# Patient Record
Sex: MALE | Race: OTHER | URBAN - METROPOLITAN AREA
[De-identification: names, ages, dates, MRNs, and addresses within clinical notes are randomized per-mention and may not be internally consistent; named-entity substitution may affect disease eponyms.]

---

## 2022-04-11 ENCOUNTER — HOSPITAL ENCOUNTER (OUTPATIENT)
Dept: LAB | Age: 47
Discharge: HOME | End: 2022-04-11

## 2022-04-11 ENCOUNTER — OFFICE VISIT (OUTPATIENT)
Dept: FAMILY MEDICINE CLINIC | Age: 47
End: 2022-04-11

## 2022-04-11 VITALS
HEART RATE: 70 BPM | SYSTOLIC BLOOD PRESSURE: 109 MMHG | OXYGEN SATURATION: 96 % | BODY MASS INDEX: 28.88 KG/M2 | DIASTOLIC BLOOD PRESSURE: 68 MMHG | TEMPERATURE: 97.8 F | WEIGHT: 184 LBS | HEIGHT: 67 IN

## 2022-04-11 DIAGNOSIS — Z00.00 WELLNESS EXAMINATION: Primary | ICD-10-CM

## 2022-04-11 DIAGNOSIS — Z00.00 WELLNESS EXAMINATION: ICD-10-CM

## 2022-04-11 PROCEDURE — 99203 OFFICE O/P NEW LOW 30 MIN: CPT | Performed by: FAMILY MEDICINE

## 2022-04-11 NOTE — PATIENT INSTRUCTIONS
Flonase nasal spray  Okay to buy generic FLUTICASONE nasal spray  The [employer] brand is called \"Allergy relief nasal spray\"

## 2022-04-11 NOTE — PROGRESS NOTES
I have printed AVS and reviewed it with patient today. Patient verbalized understanding. I reviewed the patient instructions with the patient and explained that he can purchase the Flonase OTC. Patient verbalized understanding. The patient correctly confirmed his complete name and date of birth prior to the information shared.  Onur Cartagena with the [place] [id number] assisted with this discharge.  Skinny Cruz RN

## 2022-04-11 NOTE — PROGRESS NOTES
Maria Guadalupe Alberto is a 55 y.o. male   Chief Complaint   Patient presents with    Physical         ASSESSMENT AND PLAN:    1. Wellness examination  Normal vital signs. Asymptomatic, with some PE findings c/w seasonal allergies. Normal colonscopy 5 years ago, not due until 2027. Basic labs today. Followup PRN.  - HEMOGLOBIN A1C WITH EAG; Future  - CBC WITH AUTOMATED DIFF; Future  - METABOLIC PANEL, COMPREHENSIVE; Future  - LIPID PANEL; Future          SUBJECTIVE:    HPI:  Basim BuelliCassidy Heard is a 55 y.o. male who presents for a wellness exam.  He is feeling well overall and does not have any specific concern. His last checkup was about a year ago. PMH: High cholesterol. He was on medication which was discontinued due to elevated LFTs. Started watching his diet one week ago. PSH: Meniscus and ligament repair ~3 years ago after soccer injury. MEDS: None  ALL: NKDA  SH: Denies nicotine/ tobacco use. alcohol consumption once every 6 months. FH: Non-contributory      Review of Systems   Constitutional: Negative for fever and malaise/fatigue. Eyes: Negative for blurred vision. Respiratory: Negative for cough and shortness of breath. Cardiovascular: Negative for chest pain, palpitations and leg swelling. Gastrointestinal: Negative for abdominal pain, constipation, diarrhea, nausea and vomiting. Neurological: Negative for dizziness and headaches. /68 (BP 1 Location: Left upper arm, BP Patient Position: Sitting)   Pulse 70   Temp 97.8 °F (36.6 °C) (Temporal)   Ht 5' 7.17\" (1.706 m)   Wt 184 lb (83.5 kg)   SpO2 96%   BMI 28.68 kg/m²     Physical Exam  Constitutional:       General: He is not in acute distress. Appearance: Normal appearance. HENT:      Head: Normocephalic and atraumatic. Right Ear: A middle ear effusion (clear fluid) is present. Left Ear: A middle ear effusion (clear fluid) is present.       Mouth/Throat:      Mouth: Mucous membranes are moist.      Pharynx: Oropharynx is clear. Posterior oropharyngeal erythema (cobblestoning/ PND) present. No oropharyngeal exudate. Eyes:      Extraocular Movements: Extraocular movements intact. Conjunctiva/sclera: Conjunctivae normal.      Pupils: Pupils are equal, round, and reactive to light. Neck:      Vascular: No carotid bruit. Cardiovascular:      Rate and Rhythm: Normal rate and regular rhythm. Pulses: Normal pulses. Heart sounds: Normal heart sounds. Pulmonary:      Effort: Pulmonary effort is normal. No respiratory distress. Breath sounds: Normal breath sounds. Abdominal:      General: Bowel sounds are normal. There is no distension. Palpations: Abdomen is soft. Tenderness: There is no abdominal tenderness. Musculoskeletal:         General: Normal range of motion. Cervical back: No tenderness. Right lower leg: No edema. Left lower leg: No edema. Lymphadenopathy:      Cervical: No cervical adenopathy. Skin:     General: Skin is warm. Neurological:      Mental Status: He is alert and oriented to person, place, and time.       Gait: Gait normal.      Deep Tendon Reflexes: Reflexes normal.   Psychiatric:         Behavior: Behavior normal.

## 2022-04-12 LAB
ALBUMIN SERPL-MCNC: 4.6 G/DL (ref 3.5–5)
ALBUMIN/GLOB SERPL: 1.4 {RATIO} (ref 1.1–2.2)
ALP SERPL-CCNC: 72 U/L (ref 45–117)
ALT SERPL-CCNC: 30 U/L (ref 12–78)
ANION GAP SERPL CALC-SCNC: 4 MMOL/L (ref 5–15)
AST SERPL-CCNC: 18 U/L (ref 15–37)
BASOPHILS # BLD: 0 K/UL (ref 0–0.1)
BASOPHILS NFR BLD: 1 % (ref 0–1)
BILIRUB SERPL-MCNC: 0.6 MG/DL (ref 0.2–1)
BUN SERPL-MCNC: 16 MG/DL (ref 6–20)
BUN/CREAT SERPL: 19 (ref 12–20)
CALCIUM SERPL-MCNC: 9.6 MG/DL (ref 8.5–10.1)
CHLORIDE SERPL-SCNC: 106 MMOL/L (ref 97–108)
CHOLEST SERPL-MCNC: 239 MG/DL
CO2 SERPL-SCNC: 28 MMOL/L (ref 21–32)
CREAT SERPL-MCNC: 0.86 MG/DL (ref 0.7–1.3)
DIFFERENTIAL METHOD BLD: NORMAL
EOSINOPHIL # BLD: 0.1 K/UL (ref 0–0.4)
EOSINOPHIL NFR BLD: 1 % (ref 0–7)
ERYTHROCYTE [DISTWIDTH] IN BLOOD BY AUTOMATED COUNT: 12.5 % (ref 11.5–14.5)
EST. AVERAGE GLUCOSE BLD GHB EST-MCNC: 111 MG/DL
GLOBULIN SER CALC-MCNC: 3.3 G/DL (ref 2–4)
GLUCOSE SERPL-MCNC: 78 MG/DL (ref 65–100)
HBA1C MFR BLD: 5.5 % (ref 4–5.6)
HCT VFR BLD AUTO: 46.6 % (ref 36.6–50.3)
HDLC SERPL-MCNC: 41 MG/DL
HDLC SERPL: 5.8 {RATIO} (ref 0–5)
HGB BLD-MCNC: 14.9 G/DL (ref 12.1–17)
IMM GRANULOCYTES # BLD AUTO: 0 K/UL (ref 0–0.04)
IMM GRANULOCYTES NFR BLD AUTO: 0 % (ref 0–0.5)
LDLC SERPL CALC-MCNC: 150.8 MG/DL (ref 0–100)
LYMPHOCYTES # BLD: 2.4 K/UL (ref 0.8–3.5)
LYMPHOCYTES NFR BLD: 47 % (ref 12–49)
MCH RBC QN AUTO: 29.5 PG (ref 26–34)
MCHC RBC AUTO-ENTMCNC: 32 G/DL (ref 30–36.5)
MCV RBC AUTO: 92.3 FL (ref 80–99)
MONOCYTES # BLD: 0.4 K/UL (ref 0–1)
MONOCYTES NFR BLD: 8 % (ref 5–13)
NEUTS SEG # BLD: 2.2 K/UL (ref 1.8–8)
NEUTS SEG NFR BLD: 43 % (ref 32–75)
NRBC # BLD: 0 K/UL (ref 0–0.01)
NRBC BLD-RTO: 0 PER 100 WBC
PLATELET # BLD AUTO: 316 K/UL (ref 150–400)
PMV BLD AUTO: 10.3 FL (ref 8.9–12.9)
POTASSIUM SERPL-SCNC: 4.4 MMOL/L (ref 3.5–5.1)
PROT SERPL-MCNC: 7.9 G/DL (ref 6.4–8.2)
RBC # BLD AUTO: 5.05 M/UL (ref 4.1–5.7)
SODIUM SERPL-SCNC: 138 MMOL/L (ref 136–145)
TRIGL SERPL-MCNC: 236 MG/DL (ref ?–150)
VLDLC SERPL CALC-MCNC: 47.2 MG/DL
WBC # BLD AUTO: 5.1 K/UL (ref 4.1–11.1)

## 2022-04-12 PROCEDURE — 80053 COMPREHEN METABOLIC PANEL: CPT

## 2022-04-12 PROCEDURE — 85025 COMPLETE CBC W/AUTO DIFF WBC: CPT

## 2022-04-12 PROCEDURE — 83036 HEMOGLOBIN GLYCOSYLATED A1C: CPT

## 2022-04-12 PROCEDURE — 80061 LIPID PANEL: CPT

## 2022-04-13 NOTE — PROGRESS NOTES
Please let him know that his labwork looked good overall. He is not diabetic, he is not anemic. His liver and kidney function are good. His electrolytes are normal.    As he knows, his cholesterol is elevated. Since he cannot take the medication, he should keep working on his diet. We'll check it again at his next wellness exam.    Thanks.

## 2022-04-18 NOTE — PROGRESS NOTES
Called and spoke with patient verified first,last name and . Informed patient of labs results per Dr Vitaly Ibanez Please let him know that his labwork looked good overall. He is not diabetic, he is not anemic. His liver and kidney function are good. His electrolytes are normal.     As he knows, his cholesterol is elevated. Since he cannot take the medication, he should keep working on his diet. We'll check it again at his next wellness exam.     Thanks.

## 2022-12-15 ENCOUNTER — OFFICE VISIT (OUTPATIENT)
Dept: FAMILY MEDICINE CLINIC | Age: 47
End: 2022-12-15

## 2022-12-15 ENCOUNTER — HOSPITAL ENCOUNTER (OUTPATIENT)
Dept: LAB | Age: 47
Discharge: HOME | End: 2022-12-15

## 2022-12-15 VITALS
HEART RATE: 63 BPM | TEMPERATURE: 97 F | OXYGEN SATURATION: 99 % | BODY MASS INDEX: 27.49 KG/M2 | DIASTOLIC BLOOD PRESSURE: 73 MMHG | HEIGHT: 68 IN | WEIGHT: 181.4 LBS | SYSTOLIC BLOOD PRESSURE: 127 MMHG

## 2022-12-15 DIAGNOSIS — R10.11 RUQ PAIN: ICD-10-CM

## 2022-12-15 DIAGNOSIS — R10.11 RUQ PAIN: Primary | ICD-10-CM

## 2022-12-15 LAB
ALBUMIN SERPL-MCNC: 4.6 G/DL (ref 3.5–5)
ALBUMIN/GLOB SERPL: 1.3 {RATIO} (ref 1.1–2.2)
ALP SERPL-CCNC: 69 U/L (ref 45–117)
ALT SERPL-CCNC: 31 U/L (ref 12–78)
ANION GAP SERPL CALC-SCNC: 3 MMOL/L (ref 5–15)
APPEARANCE UR: CLEAR
AST SERPL-CCNC: 21 U/L (ref 15–37)
BILIRUB SERPL-MCNC: 0.5 MG/DL (ref 0.2–1)
BILIRUB UR QL: NEGATIVE
BUN SERPL-MCNC: 15 MG/DL (ref 6–20)
BUN/CREAT SERPL: 17 (ref 12–20)
CALCIUM SERPL-MCNC: 9.1 MG/DL (ref 8.5–10.1)
CHLORIDE SERPL-SCNC: 104 MMOL/L (ref 97–108)
CO2 SERPL-SCNC: 31 MMOL/L (ref 21–32)
COLOR UR: NORMAL
CREAT SERPL-MCNC: 0.87 MG/DL (ref 0.7–1.3)
GLOBULIN SER CALC-MCNC: 3.5 G/DL (ref 2–4)
GLUCOSE SERPL-MCNC: 88 MG/DL (ref 65–100)
GLUCOSE UR STRIP.AUTO-MCNC: NEGATIVE MG/DL
HGB UR QL STRIP: NEGATIVE
KETONES UR QL STRIP.AUTO: NEGATIVE MG/DL
LEUKOCYTE ESTERASE UR QL STRIP.AUTO: NEGATIVE
LIPASE SERPL-CCNC: 200 U/L (ref 73–393)
NITRITE UR QL STRIP.AUTO: NEGATIVE
PH UR STRIP: 5.5 [PH] (ref 5–8)
POTASSIUM SERPL-SCNC: 4.3 MMOL/L (ref 3.5–5.1)
PROT SERPL-MCNC: 8.1 G/DL (ref 6.4–8.2)
PROT UR STRIP-MCNC: NEGATIVE MG/DL
SODIUM SERPL-SCNC: 138 MMOL/L (ref 136–145)
SP GR UR REFRACTOMETRY: 1.02 (ref 1–1.03)
UROBILINOGEN UR QL STRIP.AUTO: 0.2 EU/DL (ref 0.2–1)

## 2022-12-15 PROCEDURE — 81003 URINALYSIS AUTO W/O SCOPE: CPT

## 2022-12-15 PROCEDURE — 80053 COMPREHEN METABOLIC PANEL: CPT

## 2022-12-15 PROCEDURE — 99213 OFFICE O/P EST LOW 20 MIN: CPT | Performed by: FAMILY MEDICINE

## 2022-12-15 PROCEDURE — 83690 ASSAY OF LIPASE: CPT

## 2022-12-15 PROCEDURE — 36415 COLL VENOUS BLD VENIPUNCTURE: CPT

## 2022-12-15 NOTE — PROGRESS NOTES
Coordination of Care  1. Have you been to the ER, urgent care clinic since your last visit? Hospitalized since your last visit? No    2. Have you seen or consulted any other health care providers outside of the [address] [place] [address] since your last visit? Include any pap smears or colon screening. Yes - [facility] about a week ago. Does the patient need refills? N/A    Learning Assessment Complete?  yes  Depression Screening complete in the past 12 months? yes

## 2022-12-15 NOTE — PROGRESS NOTES
Name and . An After Visit Summary was printed and given to the patient. Information on the AVS was explained to patient/guardian and understanding was verbalized Time was made for question and answers. Assisted patient in making an appointment on 22 at [place]. Special instructions not to eat or drink the day before after midnight. Patient informed that he will need to apply for the Care Card after he receives a bill for the [address] [place] [address].

## 2022-12-15 NOTE — PROGRESS NOTES
Manas Nevarez (: 1975) is a 52 y.o. male, established patient, here for evaluation of the following chief complaint(s):  Abdominal Pain (Pt reports abdominal pain in RLQ (2/10), started one week ago. He reports that his stool started to turn light yellow/brown three days ago. He also reports an elevated lipid test from previous blood work at [facility] about a week ago. )       ASSESSMENT/PLAN:  1. RUQ pain  Now with change in BM suggesting choledocholithiasis. Repeat labs and get US.  -     METABOLIC PANEL, COMPREHENSIVE; Future  -     URINALYSIS W/ RFLX MICROSCOPIC; Future  -     LIPASE; Future  -     US ABD LTD; Future    Follow-up and Dispositions    Return for follow up for VV in 1 week for RUQ pain. SUBJECTIVE:  RUQ Pain: x 1 week. Off/on. Associated with nausea. Not associated with food or BM. Went to [facility] and told labs were NL. Given Prilosec which he is not taking consi  Nausea is less, has noted change in stool. SHx:  Is a student at [employer]. Applied for Medicaid but was declined. Private insurance . Review of Systems   Constitutional:  Negative for chills and fever. Gastrointestinal:  Negative for constipation and vomiting. Genitourinary:  Negative for dysuria. No jaundice. OBJECTIVE:  Blood pressure 127/73, pulse 63, temperature 97 °F (36.1 °C), temperature source Temporal, height 5' 7.52\" (1.715 m), weight 181 lb 6.4 oz (82.3 kg), SpO2 99 %. Physical Exam  CONSTITUTIONAL:  Well developed. No apparent distress. PSYCHIATRIC: Oriented to time, place, person & situation. Appropriate mood and affect. ABDOMEN:  Normal bowel sounds. Abdomen soft, non tender. No hepatosplenomegaly or masses. No results found for this visit on 12/15/22. An electronic signature was used to authenticate this note.   -- Solitarioreld MD Laura

## 2022-12-20 ENCOUNTER — VIRTUAL VISIT (OUTPATIENT)
Dept: FAMILY MEDICINE CLINIC | Age: 47
End: 2022-12-20

## 2022-12-20 DIAGNOSIS — R10.11 RUQ PAIN: Primary | ICD-10-CM

## 2022-12-20 PROCEDURE — 99441 PR PHYS/QHP TELEPHONE EVALUATION 5-10 MIN: CPT | Performed by: FAMILY MEDICINE

## 2022-12-20 NOTE — PROGRESS NOTES
Kelsy Spence (: 1975) is a 52 y.o. male, established patient, Virtual Visit for evaluation of the following chief complaint(s):   Abdominal Pain (Follow up for intermittent RUQ pain. Pain a is bit stronger now, rates 3 on pain scale (0-10) not associated with movement. Pain described as pressure in abdominal area below ribcage.)       ASSESSMENT/PLAN:  1. RUQ pain  Labs unrevealing, do not suggest cholelithiasis. Follow up with US. Ddx include gastritis, post viral.    No follow-ups on file. SUBJECTIVE/OBJECTIVE:  VV for labs review  RUQ Pain:  x 2 weeks. Off/on. Feels like a RUQ pressure at times, up to 3/10. Not severe. Nausea has resolved. BM continue to be lighter in color which is a change. Review of Systems   Constitutional:  Negative for chills and fever. Gastrointestinal:  Negative for constipation, diarrhea, nausea and vomiting. No data recorded    Physical Exam    On this date 2022 I have spent 7 minutes reviewing previous notes, test results and face to face (virtual) with the patient discussing the diagnosis and importance of compliance with the treatment plan as well as documenting on the day of the visit. Kelsy Spence, was evaluated through a synchronous (real-time) audio-video encounter. The patient (or guardian if applicable) is aware that this is a billable service, which includes applicable co-pays. This Virtual Visit was conducted with patient's (and/or legal guardian's) consent. The visit was conducted pursuant to the emergency declaration under the [address] [place] [address] authority and the [employer] General Act. Patient identification was verified, and a caregiver was present when appropriate.   The patient was located at: Home: [place]  The provider was located at: Home: [unfilled]    Patient identification was verified at the start of the visit: YES    Services were provided through a phone synchronous discussion virtually to substitute for in-person clinic visit. Patient was located at home and provider was located in office or at home. An electronic signature was used to authenticate this note.   -- Jose Luis Caballero MD

## 2022-12-20 NOTE — PROGRESS NOTES
Coordination of Care  1. Have you been to the ER, urgent care clinic since your last visit? Hospitalized since your last visit? No    2. Have you seen or consulted any other health care providers outside of the [address] [place] [address] since your last visit? Include any pap smears or colon screening. No    Does the patient need refills? NO    Learning Assessment Complete?  yes  Depression Screening complete in the past 12 months? yes

## 2022-12-27 ENCOUNTER — HOSPITAL ENCOUNTER (OUTPATIENT)
Dept: ULTRASOUND IMAGING | Age: 47
Discharge: HOME | End: 2022-12-27
Attending: FAMILY MEDICINE

## 2022-12-27 DIAGNOSIS — R10.11 RUQ PAIN: ICD-10-CM

## 2022-12-27 PROCEDURE — 76705 ECHO EXAM OF ABDOMEN: CPT

## 2022-12-28 ENCOUNTER — TELEPHONE (OUTPATIENT)
Dept: FAMILY MEDICINE CLINIC | Age: 47
End: 2022-12-28

## 2022-12-28 NOTE — TELEPHONE ENCOUNTER
Von from [facility] radiology department is requesting a call back would like to know if  received the xray report from the patient, please call von back at [phone]

## 2023-01-04 NOTE — PROGRESS NOTES
Spoke with patient and reviewed US results: NL except for mild changes c/w fatty liver. He no longer has pain. BM are still different than prior. Recommend starting OTC Probiotics and follow up if pain recurs.

## 2023-05-22 ENCOUNTER — TELEMEDICINE (OUTPATIENT)
Age: 48
End: 2023-05-22

## 2023-05-22 DIAGNOSIS — E78.2 MIXED HYPERLIPIDEMIA: ICD-10-CM

## 2023-05-22 DIAGNOSIS — R13.10 DYSPHAGIA, UNSPECIFIED TYPE: Primary | ICD-10-CM

## 2023-05-22 PROCEDURE — 99213 OFFICE O/P EST LOW 20 MIN: CPT | Performed by: FAMILY MEDICINE

## 2023-05-22 RX ORDER — PANTOPRAZOLE SODIUM 40 MG/1
40 TABLET, DELAYED RELEASE ORAL
Qty: 30 TABLET | Refills: 1 | Status: SHIPPED | OUTPATIENT
Start: 2023-05-22

## 2023-05-22 SDOH — ECONOMIC STABILITY: TRANSPORTATION INSECURITY
IN THE PAST 12 MONTHS, HAS LACK OF TRANSPORTATION KEPT YOU FROM MEETINGS, WORK, OR FROM GETTING THINGS NEEDED FOR DAILY LIVING?: NO

## 2023-05-22 SDOH — ECONOMIC STABILITY: FOOD INSECURITY: WITHIN THE PAST 12 MONTHS, YOU WORRIED THAT YOUR FOOD WOULD RUN OUT BEFORE YOU GOT MONEY TO BUY MORE.: NEVER TRUE

## 2023-05-22 SDOH — ECONOMIC STABILITY: INCOME INSECURITY: HOW HARD IS IT FOR YOU TO PAY FOR THE VERY BASICS LIKE FOOD, HOUSING, MEDICAL CARE, AND HEATING?: NOT VERY HARD

## 2023-05-22 SDOH — ECONOMIC STABILITY: FOOD INSECURITY: WITHIN THE PAST 12 MONTHS, THE FOOD YOU BOUGHT JUST DIDN'T LAST AND YOU DIDN'T HAVE MONEY TO GET MORE.: NEVER TRUE

## 2023-05-22 ASSESSMENT — PATIENT HEALTH QUESTIONNAIRE - PHQ9
2. FEELING DOWN, DEPRESSED OR HOPELESS: 0
SUM OF ALL RESPONSES TO PHQ QUESTIONS 1-9: 0
SUM OF ALL RESPONSES TO PHQ QUESTIONS 1-9: 0
SUM OF ALL RESPONSES TO PHQ9 QUESTIONS 1 & 2: 0
SUM OF ALL RESPONSES TO PHQ QUESTIONS 1-9: 0
SUM OF ALL RESPONSES TO PHQ QUESTIONS 1-9: 0
1. LITTLE INTEREST OR PLEASURE IN DOING THINGS: 0

## 2023-05-22 NOTE — PROGRESS NOTES
Belinda Barber was spoken to at the time of virtual discharge. Full name and  verified. The After Visit Summary was reviewed. Patient made aware that a patient  will contact him to schedule a 2 week follow up appointment as well as a labs appointment. Reviewed medication list with the patient, teach back method was utilized to ensure patient accurately understands how to and when to take prescribed medication. [place] coupon was provided and explained how to use. Possible side effects and medication compliance were reiterated to ensure. Time for questions and answers provided, patient verbalizes understanding.

## 2023-05-22 NOTE — PROGRESS NOTES
El Tesfaye (: 1975) is a 52 y.o. male, Established patient patient, Virtual Visit for evaluation of the following chief complaint(s):   GI Problem (Esophagus and throat feels like something is in it. Has stomach issues at times. ) and Hyperlipidemia (Has hx of this wants it checked. )       ASSESSMENT/PLAN:  1. Dysphagia, unspecified type  Start Protonix for possible GERD given his recent hx. Recheck F2F.  2. Mixed hyperlipidemia  Due for recheck. -     Lipid Panel; Future  -     TSH; Future    Return for follow up F2F in 2 weeks for throat discomfort, next available lab appt. .    SUBJECTIVE/OBJECTIVE:  Video Visit for GI complaint. Throat:  3-4 weeks of upper throat discomfort. Feels like it is difficult to swallowing. No food getting stuck. Denies fevers. Worse feeling at night. Occasional nausea. Review of Systems     Patient-Reported Vitals  BP Observations: No, remote/electronic monitoring device was not used or able to be verified  Patient-Reported Pulse: 82  Patient-Reported Pulse Oximetry: 96 %     Physical Exam  CONSTITUTIONAL:  Well developed. No apparent distress. PSYCHIATRIC: Oriented to time, place, person & situation. Appropriate mood and affect. NECK:  Normal inspection    El Tesfaye, was evaluated through a synchronous (real-time) audio-video encounter. The patient (or guardian if applicable) is aware that this is a billable service, which includes applicable co-pays. This Virtual Visit was conducted with patient's (and/or legal guardian's) consent. Patient identification was verified, and a caregiver was present when appropriate. The patient was located at Home:   [facility] [address] [facility] [address]  Provider was located at Home ([facility]): [address] were provided through a video synchronous discussion virtually to substitute for in-person clinic visit.  Patient was located at home and provider was located in office

## 2023-06-05 ENCOUNTER — NURSE ONLY (OUTPATIENT)
Age: 48
End: 2023-06-05

## 2023-06-05 ENCOUNTER — HOSPITAL ENCOUNTER (OUTPATIENT)
Facility: HOSPITAL | Age: 48
Setting detail: SPECIMEN
Discharge: HOME | End: 2023-06-08

## 2023-06-05 PROCEDURE — 84443 ASSAY THYROID STIM HORMONE: CPT

## 2023-06-05 PROCEDURE — 80061 LIPID PANEL: CPT

## 2023-06-05 PROCEDURE — 36415 COLL VENOUS BLD VENIPUNCTURE: CPT

## 2023-06-06 LAB
CHOLEST SERPL-MCNC: 250 MG/DL
HDLC SERPL-MCNC: 40 MG/DL
HDLC SERPL: 6.3 (ref 0–5)
LDLC SERPL CALC-MCNC: 160.8 MG/DL (ref 0–100)
TRIGL SERPL-MCNC: 246 MG/DL
TSH SERPL DL<=0.05 MIU/L-ACNC: 1.95 UIU/ML (ref 0.36–3.74)
VLDLC SERPL CALC-MCNC: 49.2 MG/DL

## 2023-07-10 ENCOUNTER — TELEMEDICINE (OUTPATIENT)
Age: 48
End: 2023-07-10

## 2023-07-10 DIAGNOSIS — F41.0 ANXIETY ATTACK: Primary | ICD-10-CM

## 2023-07-10 PROCEDURE — 99442 PR PHYS/QHP TELEPHONE EVALUATION 11-20 MIN: CPT | Performed by: FAMILY MEDICINE

## 2023-07-10 RX ORDER — CITALOPRAM 10 MG/1
10 TABLET ORAL DAILY
Qty: 30 TABLET | Refills: 3 | Status: SHIPPED | OUTPATIENT
Start: 2023-07-10

## 2023-07-10 ASSESSMENT — ANXIETY QUESTIONNAIRES
6. BECOMING EASILY ANNOYED OR IRRITABLE: 0
GAD7 TOTAL SCORE: 4
7. FEELING AFRAID AS IF SOMETHING AWFUL MIGHT HAPPEN: 1
3. WORRYING TOO MUCH ABOUT DIFFERENT THINGS: 1
IF YOU CHECKED OFF ANY PROBLEMS ON THIS QUESTIONNAIRE, HOW DIFFICULT HAVE THESE PROBLEMS MADE IT FOR YOU TO DO YOUR WORK, TAKE CARE OF THINGS AT HOME, OR GET ALONG WITH OTHER PEOPLE: NOT DIFFICULT AT ALL
1. FEELING NERVOUS, ANXIOUS, OR ON EDGE: 1
4. TROUBLE RELAXING: 1
5. BEING SO RESTLESS THAT IT IS HARD TO SIT STILL: 0
2. NOT BEING ABLE TO STOP OR CONTROL WORRYING: 0

## 2023-07-10 NOTE — PROGRESS NOTES
Attempted to call patient x2 to give discharge summary of today's VV. No answer. Left VM with our call back numbers in case of any questions.  Jarrod Plata RN
Chief Complaint   Patient presents with    Anxiety     Follow up     Coordination of Care  1. Have you been to the ER, urgent care clinic since your last visit? Hospitalized since your last visit? No    2. Have you seen or consulted any other health care providers outside of the [address] [place] [address] since your last visit? Include any pap smears or colon screening. No  Does the patient need refills? YES    Learning Assessment Complete?  yes  Depression Screening complete in the past 12 months? yes
was located at Home ([address] [facility]): [place]     Patient identification was verified at the start of the visit: yes    Services were provided through a phone synchronous discussion virtually to substitute for in-person clinic visit. Patient was located at home and provider was located in office or at home. An electronic signature was used to authenticate this note.   -- Steve Crum MD

## 2023-08-21 ENCOUNTER — TELEMEDICINE (OUTPATIENT)
Age: 48
End: 2023-08-21

## 2023-08-21 DIAGNOSIS — K21.9 GASTROESOPHAGEAL REFLUX DISEASE WITHOUT ESOPHAGITIS: Primary | ICD-10-CM

## 2023-08-21 PROCEDURE — 99442 PR PHYS/QHP TELEPHONE EVALUATION 11-20 MIN: CPT | Performed by: FAMILY MEDICINE

## 2023-08-21 RX ORDER — ATORVASTATIN CALCIUM 20 MG/1
20 TABLET, FILM COATED ORAL DAILY
Qty: 90 TABLET | Refills: 3 | Status: SHIPPED | OUTPATIENT
Start: 2023-08-21

## 2023-08-21 SDOH — ECONOMIC STABILITY: FOOD INSECURITY: WITHIN THE PAST 12 MONTHS, YOU WORRIED THAT YOUR FOOD WOULD RUN OUT BEFORE YOU GOT MONEY TO BUY MORE.: NEVER TRUE

## 2023-08-21 SDOH — ECONOMIC STABILITY: INCOME INSECURITY: HOW HARD IS IT FOR YOU TO PAY FOR THE VERY BASICS LIKE FOOD, HOUSING, MEDICAL CARE, AND HEATING?: NOT VERY HARD

## 2023-08-21 SDOH — ECONOMIC STABILITY: FOOD INSECURITY: WITHIN THE PAST 12 MONTHS, THE FOOD YOU BOUGHT JUST DIDN'T LAST AND YOU DIDN'T HAVE MONEY TO GET MORE.: NEVER TRUE

## 2023-08-21 SDOH — ECONOMIC STABILITY: HOUSING INSECURITY
IN THE LAST 12 MONTHS, WAS THERE A TIME WHEN YOU DID NOT HAVE A STEADY PLACE TO SLEEP OR SLEPT IN A SHELTER (INCLUDING NOW)?: NO

## 2023-08-21 ASSESSMENT — PATIENT HEALTH QUESTIONNAIRE - PHQ9
SUM OF ALL RESPONSES TO PHQ QUESTIONS 1-9: 0
SUM OF ALL RESPONSES TO PHQ9 QUESTIONS 1 & 2: 0
SUM OF ALL RESPONSES TO PHQ QUESTIONS 1-9: 0
SUM OF ALL RESPONSES TO PHQ QUESTIONS 1-9: 0
2. FEELING DOWN, DEPRESSED OR HOPELESS: 0
1. LITTLE INTEREST OR PLEASURE IN DOING THINGS: 0
SUM OF ALL RESPONSES TO PHQ QUESTIONS 1-9: 0

## 2023-08-21 NOTE — PROGRESS NOTES
Jadiel Rodriguez (: 1975) is a 52 y.o. male, Established patient, Virtual Visit for evaluation of the following chief complaint(s): Anxiety (Follow up)       ASSESSMENT/PLAN:  1. Gastroesophageal reflux disease without esophagitis  -     H. Pylori Antigen, Stool; Future  Recurrent nausea and GERD sx. Will check H. Pylori. His anxiety sx seem to have improved at this time. Return for follow up as scheduled. .    SUBJECTIVE/OBJECTIVE:  VV for anxiety  Anxiety:  Patient picked up Celexa but never tried it. The day after last visit he had family come and stay with him and he felt less anxious. He has not had any further spells of feeling like his throat is closing. No associated tachycardia, SOB. Last night he had another spell of nausea and loose stools. This is the first spells since last visit. Denies fevers, chills, vomiting, blood in stool. Had spell 2023 associated with tachycardia (up to 120s) with some chest pressure and so went to [address] [facility]. It resolved when he relaxed. [address] [facility] did EKG, images and was told it was not his heart. Shx: No smoking  Fhx: F - AMI at 78 yo (2023)    Review of Systems     Patient-Reported Vitals  BP Observations: No, remote/electronic monitoring device was not used or able to be verified  Patient-Reported Temperature: 97.7 axillary    Physical Exam    On this date 2023 I have spent 18 minutes reviewing previous notes, test results and face to face (virtual) with the patient discussing the diagnosis and importance of compliance with the treatment plan as well as documenting on the day of the visit. Jadiel Rodriguez, was evaluated through a synchronous (real-time) audio-video encounter. The patient (or guardian if applicable) is aware that this is a billable service, which includes applicable co-pays. This Virtual Visit was conducted with patient's (and/or legal guardian's) consent.  Patient identification was verified, and a caregiver was present

## 2023-08-21 NOTE — PROGRESS NOTES
The pt verified his name or . Coordination of Care  1. Have you been to the ER, urgent care clinic since your last visit? Hospitalized since your last visit? No    2. Have you seen or consulted any other health care providers outside of the [address] [place] [address] since your last visit? Include any pap smears or colon screening. No  Does the patient need refills? Yes    Learning Assessment Complete? no  Depression Screening complete in the past 12 months? yes

## 2023-10-13 ENCOUNTER — NURSE ONLY (OUTPATIENT)
Age: 48
End: 2023-10-13

## 2023-10-13 ENCOUNTER — HOSPITAL ENCOUNTER (OUTPATIENT)
Facility: HOSPITAL | Age: 48
Setting detail: SPECIMEN
Discharge: HOME | End: 2023-10-16

## 2023-10-13 DIAGNOSIS — K21.9 GASTROESOPHAGEAL REFLUX DISEASE WITHOUT ESOPHAGITIS: ICD-10-CM

## 2023-10-13 PROCEDURE — 87338 HPYLORI STOOL AG IA: CPT

## 2023-10-13 NOTE — PROGRESS NOTES
Pt presented to lab for specimen drop off. Specimen was collected correctly by the patient with no complications.

## 2023-10-17 LAB
H PYLORI AG STL QL IA: NEGATIVE
SPECIMEN SOURCE: NORMAL

## 2023-11-06 ENCOUNTER — TELEMEDICINE (OUTPATIENT)
Age: 48
End: 2023-11-06

## 2023-11-06 ENCOUNTER — NURSE ONLY (OUTPATIENT)
Age: 48
End: 2023-11-06

## 2023-11-06 DIAGNOSIS — K21.9 GASTROESOPHAGEAL REFLUX DISEASE WITHOUT ESOPHAGITIS: Primary | ICD-10-CM

## 2023-11-06 DIAGNOSIS — E78.00 PURE HYPERCHOLESTEROLEMIA: ICD-10-CM

## 2023-11-06 PROCEDURE — 99213 OFFICE O/P EST LOW 20 MIN: CPT | Performed by: FAMILY MEDICINE

## 2023-11-06 PROCEDURE — 90471 IMMUNIZATION ADMIN: CPT | Performed by: NURSE PRACTITIONER

## 2023-11-06 PROCEDURE — 90686 IIV4 VACC NO PRSV 0.5 ML IM: CPT | Performed by: NURSE PRACTITIONER

## 2023-11-06 SDOH — ECONOMIC STABILITY: FOOD INSECURITY: WITHIN THE PAST 12 MONTHS, THE FOOD YOU BOUGHT JUST DIDN'T LAST AND YOU DIDN'T HAVE MONEY TO GET MORE.: NEVER TRUE

## 2023-11-06 SDOH — ECONOMIC STABILITY: FOOD INSECURITY: WITHIN THE PAST 12 MONTHS, YOU WORRIED THAT YOUR FOOD WOULD RUN OUT BEFORE YOU GOT MONEY TO BUY MORE.: NEVER TRUE

## 2023-11-06 SDOH — ECONOMIC STABILITY: INCOME INSECURITY: HOW HARD IS IT FOR YOU TO PAY FOR THE VERY BASICS LIKE FOOD, HOUSING, MEDICAL CARE, AND HEATING?: NOT VERY HARD

## 2023-11-06 ASSESSMENT — PATIENT HEALTH QUESTIONNAIRE - PHQ9
SUM OF ALL RESPONSES TO PHQ QUESTIONS 1-9: 0
1. LITTLE INTEREST OR PLEASURE IN DOING THINGS: 0
SUM OF ALL RESPONSES TO PHQ9 QUESTIONS 1 & 2: 0
2. FEELING DOWN, DEPRESSED OR HOPELESS: 0
SUM OF ALL RESPONSES TO PHQ QUESTIONS 1-9: 0

## 2023-11-06 NOTE — PROGRESS NOTES
Intake call     Coordination of Care  1. Have you been to the ER, urgent care clinic since your last visit? Hospitalized since your last visit? Yes When: 1 month [address] Right arm swollen No abnormal findings. 2. Have you seen or consulted any other health care providers outside of the [address] [place] [address] Avenue since your last visit? Include any pap smears or colon screening. No  Does the patient need refills? No, unless his dose is changed.      Learning Assessment Complete? no  Depression Screening complete in the past 12 months? yes

## 2023-11-06 NOTE — PROGRESS NOTES
Joey Gonzalez (: 1975) is a 50 y.o. male, Established patient, Virtual Visit for evaluation of the following chief complaint(s):   Discuss Labs (Lab discuss.)       ASSESSMENT/PLAN:  1. Gastroesophageal reflux disease without esophagitis  Sx resolved. 2. Pure hypercholesterolemia  Continue with Lipitor. Discussed the aches that can occur with statins. No persisting aches or weakness. Will also check Vitamin D with next labs. -     Comprehensive Metabolic Panel; Future  -     Lipid Panel; Future  -     Vitamin D 25 Hydroxy; Future    Return for follow up F2F next month as scheduled. Schedule lab visit 1 week prior to his appt for lab draw. SUBJECTIVE/OBJECTIVE:  MyChart Video for follow up on GERD  GERD:  Patient is no longer having substernal pressure or GERD. Stopped Protonix. He feels it was likely stress related from his father having AMI. HLD:  Patient is taking Lipitor. Has had some aches off/on. Had Rt wrist pain and swelling, went to [facility] ED and was told it was OK. Anxiety:  Denies any further spells of tachycardia or anxiety. Shx: No smoking  Fhx: F - AMI at 78 yo (2023)    Review of Systems     Patient-Reported Vitals  No data recorded     Physical Exam  CONSTITUTIONAL:  Well developed. No apparent distress. PSYCHIATRIC: Oriented to time, place, person & situation. Appropriate mood and affect. RESPIRATORY:  Normal effort. Joey Gonzalez, was evaluated through a synchronous (real-time) audio-video encounter. The patient (or guardian if applicable) is aware that this is a billable service, which includes applicable co-pays. This Virtual Visit was conducted with patient's (and/or legal guardian's) consent. Patient identification was verified, and a caregiver was present when appropriate.    The patient was located at Home: [address] [place] [address] Drive [address] [address]  Provider was located at [address] [place] [address] ([address]): [place]       Patient

## 2023-11-06 NOTE — PROGRESS NOTES
Name and  confirmed w/ pt. Vaccine administered per order after obtaining consent, confirming VIS understanding, and ruling out contraindications. Pt instructed about signs of allergic reactions and when to seek emergency care. Pt verbalized understanding. Pt tolerated vaccination well, no adverse effects noted. Recorded in EMR and VIIS. Pt instructed to stay in waiting area for 15 min for observation. Pt discharged in stable condition.

## 2023-12-29 ENCOUNTER — HOSPITAL ENCOUNTER (OUTPATIENT)
Facility: HOSPITAL | Age: 48
Setting detail: SPECIMEN
Discharge: HOME | End: 2024-01-01

## 2023-12-29 ENCOUNTER — NURSE ONLY (OUTPATIENT)
Age: 48
End: 2023-12-29

## 2023-12-29 DIAGNOSIS — E78.00 PURE HYPERCHOLESTEROLEMIA: ICD-10-CM

## 2023-12-29 PROCEDURE — 82306 VITAMIN D 25 HYDROXY: CPT

## 2023-12-29 PROCEDURE — 36415 COLL VENOUS BLD VENIPUNCTURE: CPT

## 2023-12-29 PROCEDURE — 80053 COMPREHEN METABOLIC PANEL: CPT

## 2023-12-29 PROCEDURE — 80061 LIPID PANEL: CPT

## 2023-12-29 NOTE — PROGRESS NOTES
Patient presented to clinic for lab only appt. Name and  verified. Labs collected : CMP,Lipid, Vit D   Patient tolerated well.  Regina Bustamante RN

## 2023-12-30 LAB
25(OH)D3 SERPL-MCNC: 26.1 NG/ML (ref 30–100)
ALBUMIN SERPL-MCNC: 4.3 G/DL (ref 3.5–5)
ALBUMIN/GLOB SERPL: 1.4 (ref 1.1–2.2)
ALP SERPL-CCNC: 68 U/L (ref 45–117)
ALT SERPL-CCNC: 36 U/L (ref 12–78)
ANION GAP SERPL CALC-SCNC: 5 MMOL/L (ref 5–15)
AST SERPL-CCNC: 15 U/L (ref 15–37)
BILIRUB SERPL-MCNC: 0.9 MG/DL (ref 0.2–1)
BUN SERPL-MCNC: 11 MG/DL (ref 6–20)
BUN/CREAT SERPL: 13 (ref 12–20)
CALCIUM SERPL-MCNC: 9.4 MG/DL (ref 8.5–10.1)
CHLORIDE SERPL-SCNC: 108 MMOL/L (ref 97–108)
CHOLEST SERPL-MCNC: 159 MG/DL
CO2 SERPL-SCNC: 29 MMOL/L (ref 21–32)
CREAT SERPL-MCNC: 0.85 MG/DL (ref 0.7–1.3)
GLOBULIN SER CALC-MCNC: 3.1 G/DL (ref 2–4)
GLUCOSE SERPL-MCNC: 97 MG/DL (ref 65–100)
HDLC SERPL-MCNC: 48 MG/DL
HDLC SERPL: 3.3 (ref 0–5)
LDLC SERPL CALC-MCNC: 77.6 MG/DL (ref 0–100)
POTASSIUM SERPL-SCNC: 4.2 MMOL/L (ref 3.5–5.1)
PROT SERPL-MCNC: 7.4 G/DL (ref 6.4–8.2)
SODIUM SERPL-SCNC: 142 MMOL/L (ref 136–145)
TRIGL SERPL-MCNC: 167 MG/DL
VLDLC SERPL CALC-MCNC: 33.4 MG/DL

## 2024-01-19 ENCOUNTER — OFFICE VISIT (OUTPATIENT)
Age: 49
End: 2024-01-19

## 2024-01-19 VITALS
TEMPERATURE: 97.7 F | BODY MASS INDEX: 26.99 KG/M2 | SYSTOLIC BLOOD PRESSURE: 115 MMHG | DIASTOLIC BLOOD PRESSURE: 75 MMHG | WEIGHT: 175 LBS | HEART RATE: 65 BPM | OXYGEN SATURATION: 100 %

## 2024-01-19 DIAGNOSIS — E78.00 PURE HYPERCHOLESTEROLEMIA: Primary | ICD-10-CM

## 2024-01-19 DIAGNOSIS — E55.9 VITAMIN D DEFICIENCY: ICD-10-CM

## 2024-01-19 DIAGNOSIS — Z12.11 SCREENING FOR COLON CANCER: ICD-10-CM

## 2024-01-19 DIAGNOSIS — F41.0 ANXIETY ATTACK: ICD-10-CM

## 2024-01-19 DIAGNOSIS — M67.90 TENDON NODULE: ICD-10-CM

## 2024-01-19 PROCEDURE — 99214 OFFICE O/P EST MOD 30 MIN: CPT | Performed by: FAMILY MEDICINE

## 2024-01-19 RX ORDER — FAMOTIDINE 40 MG/1
40 TABLET, FILM COATED ORAL
COMMUNITY
Start: 2023-05-01

## 2024-01-19 RX ORDER — HYDROXYZINE PAMOATE 25 MG/1
25 CAPSULE ORAL
COMMUNITY
Start: 2023-05-01

## 2024-01-19 ASSESSMENT — PATIENT HEALTH QUESTIONNAIRE - PHQ9
SUM OF ALL RESPONSES TO PHQ QUESTIONS 1-9: 0
2. FEELING DOWN, DEPRESSED OR HOPELESS: 0
1. LITTLE INTEREST OR PLEASURE IN DOING THINGS: 0
SUM OF ALL RESPONSES TO PHQ QUESTIONS 1-9: 0
SUM OF ALL RESPONSES TO PHQ QUESTIONS 1-9: 0
SUM OF ALL RESPONSES TO PHQ9 QUESTIONS 1 & 2: 0
SUM OF ALL RESPONSES TO PHQ QUESTIONS 1-9: 0

## 2024-01-19 NOTE — PROGRESS NOTES
\"Have you been to the ER, urgent care clinic since your last visit?  Hospitalized since your last visit?\"    NO    “Have you seen or consulted any other health care providers outside of [facility] since your last visit?”    NO    “Have you had a colorectal cancer screening such as a colonoscopy/FIT/Cologuard?    NO

## 2024-01-19 NOTE — PROGRESS NOTES
Oc Leyva (: 1975) is a 48 y.o. male, Established patient, here for evaluation of the following chief complaint(s):  Results (Follow up for test results ) and Mass (Has a small hard lump on the palm of his left hand, first noticed about a month ago and has grown since than. It occasionally is painful. )       ASSESSMENT/PLAN:  1. Pure hypercholesterolemia  Controlled, continue with current medication  2. Anxiety attack  Infrequent.  Discussed using Hydroxyzine PRN  3. Vitamin D deficiency  Start OTC supplement  4. Tendon nodule  Start with Voltaren Gel.  5. Screening for colon cancer  -     Occult Blood Stool Immunoassay; Future      Return for follow up F2F in 6 months for check up.    SUBJECTIVE:  Follow up  HLD:  Patient is taking Lipitor.  Has had some aches off/on.    Anxiety:  Denies any further spells of tachycardia or anxiety.  Had 1 spell 2 months ago but by the time he got to the ED his sx had resolved.  No further spells.  Did not take Celexa.  Lt hand bump: present for a few weeks.  Occasional tender.  No locking.    Shx: No smoking  Fhx: F - AMI at 68 yo (2023)    Review of Systems    OBJECTIVE:  Blood pressure 115/75, pulse 65, temperature 97.7 °F (36.5 °C), temperature source Temporal, weight 79.4 kg (175 lb), SpO2 100 %.  Physical Exam  CONSTITUTIONAL:  Well developed.  No apparent distress.  PSYCHIATRIC: Oriented to time, place, person & situation.  Appropriate mood and affect.  NECK:  Normal inspection, normal palpation without any lymphadenopathy, masses, or thyromegaly  CARDIOVASCULAR:  Regular rate and rhythm.  Normal S1, S2.  No extra sounds.  RESPIRATORY:  Normal effort.  Normal ascultation without wheezing.  EXTREMITIES:  No edema.  MUSCULOSKELETAL: Lt hand, palm, 5 mm nodule on flexor tendon of mid finger that moves with tendon, no locking.    No results found for any visits on 24.       An electronic signature was used to authenticate this note.  -- Chilango LEACH

## 2024-01-19 NOTE — PROGRESS NOTES
Patient name and date of birth verified.   Patient given an after visit summary.   Advised to schedule next appointment before leaving clinic office.  Patient verbalized understanding of all information given at time of visit. Isabel Pina LPN    Patient given colon cancer screening kit; reviewed/ demonstrated  with patient on how to collect stool specimen.  Advised to bring to [facility] as a drop off specimen, encouraged to get a calendar at  to be aware of our clinic schedule.  Informed that it can be a drop off lab no appointment needed.  Isabel Pina LPN

## 2024-01-19 NOTE — PATIENT INSTRUCTIONS
Use Voltaren Gel en la mano izquierda curry vez diaria.  [place] Vitamina D3 1,000 unidades curry vez por blank con comida.

## 2024-02-02 ENCOUNTER — NURSE ONLY (OUTPATIENT)
Age: 49
End: 2024-02-02

## 2024-02-02 ENCOUNTER — HOSPITAL ENCOUNTER (OUTPATIENT)
Facility: HOSPITAL | Age: 49
Setting detail: SPECIMEN
Discharge: HOME | End: 2024-02-05

## 2024-02-02 DIAGNOSIS — Z12.11 SCREENING FOR COLON CANCER: ICD-10-CM

## 2024-02-02 PROCEDURE — 82274 ASSAY TEST FOR BLOOD FECAL: CPT

## 2024-02-02 NOTE — PROGRESS NOTES
[facility] FOR DROP OFF OF STOOL SPECIMEN. LAB COLLECTED: FIT TEST  PT MADE AWARE THAT RESULTS WILL BE EITHER MAILED OR CALLED. PT VERBALIZED UNDERSTANDING.  Noemi Ybarra RN

## 2024-02-05 LAB — HEMOCCULT STL QL IA: NEGATIVE

## 2025-03-11 ENCOUNTER — TELEPHONE (OUTPATIENT)
Age: 50
End: 2025-03-11

## 2025-03-11 NOTE — TELEPHONE ENCOUNTER
P/C to [facility] to schedule a follow-up appointment with Dr. Chilango Burnette, originally due in July 2024. The patient declined to schedule the appointment, stating that he has since obtained medical insurance. As a result, he was removed from the waitlist.    The patient verbalized understanding and had no further questions.    Kaelyn Pichardo